# Patient Record
Sex: MALE | Race: WHITE | NOT HISPANIC OR LATINO
[De-identification: names, ages, dates, MRNs, and addresses within clinical notes are randomized per-mention and may not be internally consistent; named-entity substitution may affect disease eponyms.]

---

## 2017-08-05 ENCOUNTER — TRANSCRIPTION ENCOUNTER (OUTPATIENT)
Age: 47
End: 2017-08-05

## 2017-12-23 ENCOUNTER — TRANSCRIPTION ENCOUNTER (OUTPATIENT)
Age: 47
End: 2017-12-23

## 2018-03-23 ENCOUNTER — TRANSCRIPTION ENCOUNTER (OUTPATIENT)
Age: 48
End: 2018-03-23

## 2018-08-25 ENCOUNTER — TRANSCRIPTION ENCOUNTER (OUTPATIENT)
Age: 48
End: 2018-08-25

## 2018-12-13 ENCOUNTER — TRANSCRIPTION ENCOUNTER (OUTPATIENT)
Age: 48
End: 2018-12-13

## 2019-11-10 ENCOUNTER — TRANSCRIPTION ENCOUNTER (OUTPATIENT)
Age: 49
End: 2019-11-10

## 2020-05-25 ENCOUNTER — TRANSCRIPTION ENCOUNTER (OUTPATIENT)
Age: 50
End: 2020-05-25

## 2020-10-30 PROBLEM — Z00.00 ENCOUNTER FOR PREVENTIVE HEALTH EXAMINATION: Status: ACTIVE | Noted: 2020-10-30

## 2020-11-02 ENCOUNTER — APPOINTMENT (OUTPATIENT)
Dept: PULMONOLOGY | Facility: CLINIC | Age: 50
End: 2020-11-02
Payer: COMMERCIAL

## 2020-11-02 VITALS
OXYGEN SATURATION: 95 % | SYSTOLIC BLOOD PRESSURE: 115 MMHG | TEMPERATURE: 98.7 F | BODY MASS INDEX: 24.38 KG/M2 | HEIGHT: 72 IN | HEART RATE: 89 BPM | WEIGHT: 180 LBS | DIASTOLIC BLOOD PRESSURE: 72 MMHG

## 2020-11-02 DIAGNOSIS — K22.2 ESOPHAGEAL OBSTRUCTION: ICD-10-CM

## 2020-11-02 DIAGNOSIS — R53.83 OTHER FATIGUE: ICD-10-CM

## 2020-11-02 DIAGNOSIS — Z83.79 FAMILY HISTORY OF OTHER DISEASES OF THE DIGESTIVE SYSTEM: ICD-10-CM

## 2020-11-02 DIAGNOSIS — J30.9 ALLERGIC RHINITIS, UNSPECIFIED: ICD-10-CM

## 2020-11-02 DIAGNOSIS — R51.9 HEADACHE, UNSPECIFIED: ICD-10-CM

## 2020-11-02 DIAGNOSIS — R25.8 OTHER ABNORMAL INVOLUNTARY MOVEMENTS: ICD-10-CM

## 2020-11-02 DIAGNOSIS — Z82.49 FAMILY HISTORY OF ISCHEMIC HEART DISEASE AND OTHER DISEASES OF THE CIRCULATORY SYSTEM: ICD-10-CM

## 2020-11-02 DIAGNOSIS — Z78.9 OTHER SPECIFIED HEALTH STATUS: ICD-10-CM

## 2020-11-02 PROCEDURE — 99072 ADDL SUPL MATRL&STAF TM PHE: CPT

## 2020-11-02 PROCEDURE — 99203 OFFICE O/P NEW LOW 30 MIN: CPT

## 2020-11-02 RX ORDER — FINASTERIDE 1 MG/1
1 TABLET ORAL
Qty: 30 | Refills: 0 | Status: ACTIVE | COMMUNITY
Start: 2020-02-24

## 2020-11-02 RX ORDER — LEVOTHYROXINE SODIUM 137 UG/1
TABLET ORAL
Refills: 0 | Status: ACTIVE | COMMUNITY

## 2020-11-02 RX ORDER — EMTRICITABINE AND TENOFOVIR DISOPROXIL FUMARATE 200; 300 MG/1; MG/1
200-300 TABLET, FILM COATED ORAL
Qty: 30 | Refills: 0 | Status: ACTIVE | COMMUNITY
Start: 2020-09-30

## 2020-11-02 RX ORDER — IPRATROPIUM BROMIDE 42 UG/1
0.06 SPRAY NASAL
Qty: 15 | Refills: 0 | Status: ACTIVE | COMMUNITY
Start: 2020-10-01

## 2020-11-02 NOTE — DISCUSSION/SUMMARY
[FreeTextEntry1] : 49 yo with hypothyroidism, reflux and fatigue referred by Dr. Oden for evaluation of possible SDB. He does endorse unrefreshed sleep despite a TST of 7 to 8 hours, and has nocturnal leg movements. He also grinds his teeth to the point of significant gum/mandible pain. He is indicated for an attended PSG to rule out a nocturnal movement disorder.  He was referred to the [facility]. His upper airway mucus production can be related to reflux versus poorly controlled rhinitis. I have advised him to trial Singulair as it can be helpful in these situations; he should continue with nasal irrigation and nasal sprays. Mr. Alberts will follow up after the PSG.

## 2020-11-02 NOTE — HISTORY OF PRESENT ILLNESS
[Never] : never [TextBox_4] : 49 yo man with hx of sinusitis, allergies, and fatigue referred for evaluation. For about 1 year has been suffering from a lot of upper airway mucus, reflux. Also with fatigue despite TST of 7 to 8 hours regularly. Never feels refreshed in the AM. Has AM headaches. Grinds/clenches teeth. Does have a mouth guard. Still has some anterior gum pain. No weight changes. Also has nocturnal leg movements he describes as twiches; no bed displacement. Hypothyroidism is controlled. Never smoker. Does have significant reflux, takes PPI; also with hx of Schatzke's ring diagnosed years ago; no recent EGD. Has tried everything for his mucus production including flonase, nasal irrigation, azelastine; currently on ipratropium and flonase; follow up with an allergist. [Awakes Unrefreshed] : awakes unrefreshed [Awakes with Headache] : awakes with headache [Fatigue] : fatigue [Nonrestorative Sleep] : nonrestorative sleep

## 2020-11-02 NOTE — CONSULT LETTER
[Dear  ___] : Dear  [unfilled], [Consult Letter:] : I had the pleasure of evaluating your patient, [unfilled]. [Please see my note below.] : Please see my note below. [Consult Closing:] : Thank you very much for allowing me to participate in the care of this patient.  If you have any questions, please do not hesitate to contact me. [Sincerely,] : Sincerely, [FreeTextEntry3] : Sherry Whitten MD\par \par [place] [facility] [employer] [facility]\par Pulmonary, Critical Care, and Sleep Medicine\par

## 2020-12-06 ENCOUNTER — RX RENEWAL (OUTPATIENT)
Age: 50
End: 2020-12-06

## 2020-12-06 RX ORDER — MONTELUKAST 10 MG/1
10 TABLET, FILM COATED ORAL
Qty: 30 | Refills: 0 | Status: ACTIVE | COMMUNITY
Start: 2020-11-02 | End: 1900-01-01

## 2021-03-12 ENCOUNTER — APPOINTMENT (OUTPATIENT)
Dept: SLEEP CENTER | Facility: HOSPITAL | Age: 51
End: 2021-03-12

## 2022-01-05 ENCOUNTER — NON-APPOINTMENT (OUTPATIENT)
Age: 52
End: 2022-01-05

## 2022-01-06 ENCOUNTER — NON-APPOINTMENT (OUTPATIENT)
Age: 52
End: 2022-01-06

## 2022-01-06 ENCOUNTER — APPOINTMENT (OUTPATIENT)
Dept: OPHTHALMOLOGY | Facility: CLINIC | Age: 52
End: 2022-01-06
Payer: COMMERCIAL

## 2022-01-06 PROCEDURE — 92002 INTRM OPH EXAM NEW PATIENT: CPT

## 2022-01-24 ENCOUNTER — APPOINTMENT (OUTPATIENT)
Dept: OPHTHALMOLOGY | Facility: CLINIC | Age: 52
End: 2022-01-24

## 2022-01-27 ENCOUNTER — NON-APPOINTMENT (OUTPATIENT)
Age: 52
End: 2022-01-27

## 2022-01-27 ENCOUNTER — APPOINTMENT (OUTPATIENT)
Dept: OPHTHALMOLOGY | Facility: CLINIC | Age: 52
End: 2022-01-27
Payer: COMMERCIAL

## 2022-01-27 PROCEDURE — 92012 INTRM OPH EXAM EST PATIENT: CPT

## 2022-03-07 ENCOUNTER — APPOINTMENT (OUTPATIENT)
Dept: OPHTHALMOLOGY | Facility: CLINIC | Age: 52
End: 2022-03-07
Payer: COMMERCIAL

## 2022-03-07 ENCOUNTER — NON-APPOINTMENT (OUTPATIENT)
Age: 52
End: 2022-03-07

## 2022-03-07 PROCEDURE — 92012 INTRM OPH EXAM EST PATIENT: CPT
